# Patient Record
Sex: MALE | Race: WHITE | NOT HISPANIC OR LATINO | Employment: FULL TIME | RURAL
[De-identification: names, ages, dates, MRNs, and addresses within clinical notes are randomized per-mention and may not be internally consistent; named-entity substitution may affect disease eponyms.]

---

## 2021-01-21 ENCOUNTER — HISTORICAL (OUTPATIENT)
Dept: ADMINISTRATIVE | Facility: HOSPITAL | Age: 26
End: 2021-01-21

## 2021-04-08 ENCOUNTER — HISTORICAL (OUTPATIENT)
Dept: ADMINISTRATIVE | Facility: HOSPITAL | Age: 26
End: 2021-04-08

## 2021-04-08 LAB — SARS-COV+SARS-COV-2 AG RESP QL IA.RAPID: NEGATIVE

## 2021-09-14 ENCOUNTER — OFFICE VISIT (OUTPATIENT)
Dept: FAMILY MEDICINE | Facility: CLINIC | Age: 26
End: 2021-09-14
Payer: OTHER GOVERNMENT

## 2021-09-14 DIAGNOSIS — R05.8 COUGH WITH EXPOSURE TO SEVERE ACUTE RESPIRATORY SYNDROME CORONAVIRUS 2 (SARS-COV-2): Primary | ICD-10-CM

## 2021-09-14 DIAGNOSIS — Z20.822 COUGH WITH EXPOSURE TO SEVERE ACUTE RESPIRATORY SYNDROME CORONAVIRUS 2 (SARS-COV-2): Primary | ICD-10-CM

## 2021-09-14 LAB
CTP QC/QA: YES
FLUAV AG NPH QL: NEGATIVE
FLUBV AG NPH QL: NEGATIVE
SARS-COV-2 AG RESP QL IA.RAPID: NEGATIVE

## 2021-09-14 PROCEDURE — 99202 OFFICE O/P NEW SF 15 MIN: CPT | Mod: GC,,, | Performed by: FAMILY MEDICINE

## 2021-09-14 PROCEDURE — 87428 POCT SARS-COV2 (COVID) WITH FLU ANTIGEN: ICD-10-PCS | Mod: QW,GC,, | Performed by: FAMILY MEDICINE

## 2021-09-14 PROCEDURE — 87428 SARSCOV & INF VIR A&B AG IA: CPT | Mod: QW,GC,, | Performed by: FAMILY MEDICINE

## 2021-09-14 PROCEDURE — 99202 PR OFFICE/OUTPT VISIT, NEW, LEVL II, 15-29 MIN: ICD-10-PCS | Mod: GC,,, | Performed by: FAMILY MEDICINE

## 2022-02-04 ENCOUNTER — OFFICE VISIT (OUTPATIENT)
Dept: FAMILY MEDICINE | Facility: CLINIC | Age: 27
End: 2022-02-04
Payer: OTHER GOVERNMENT

## 2022-02-04 VITALS — HEART RATE: 51 BPM | SYSTOLIC BLOOD PRESSURE: 115 MMHG | DIASTOLIC BLOOD PRESSURE: 76 MMHG

## 2022-02-04 DIAGNOSIS — R00.1 BRADYCARDIA: Chronic | ICD-10-CM

## 2022-02-04 DIAGNOSIS — Z12.5 SCREENING FOR MALIGNANT NEOPLASM OF PROSTATE: ICD-10-CM

## 2022-02-04 DIAGNOSIS — G89.29 CHRONIC RECTAL PAIN: Primary | Chronic | ICD-10-CM

## 2022-02-04 DIAGNOSIS — G43.709 CHRONIC MIGRAINE WITHOUT AURA WITHOUT STATUS MIGRAINOSUS, NOT INTRACTABLE: Chronic | ICD-10-CM

## 2022-02-04 DIAGNOSIS — K62.89 CHRONIC RECTAL PAIN: Primary | Chronic | ICD-10-CM

## 2022-02-04 DIAGNOSIS — R10.30 LOWER ABDOMINAL PAIN: ICD-10-CM

## 2022-02-04 LAB
ALBUMIN SERPL BCP-MCNC: 4.1 G/DL (ref 3.5–5)
ALBUMIN/GLOB SERPL: 1.1 {RATIO}
ALP SERPL-CCNC: 93 U/L (ref 45–115)
ALT SERPL W P-5'-P-CCNC: 39 U/L (ref 16–61)
ANION GAP SERPL CALCULATED.3IONS-SCNC: 7 MMOL/L (ref 7–16)
AST SERPL W P-5'-P-CCNC: 17 U/L (ref 15–37)
BASOPHILS # BLD AUTO: 0.04 K/UL (ref 0–0.2)
BASOPHILS NFR BLD AUTO: 0.6 % (ref 0–1)
BILIRUB SERPL-MCNC: 0.4 MG/DL (ref 0–1.2)
BUN SERPL-MCNC: 15 MG/DL (ref 7–18)
BUN/CREAT SERPL: 16 (ref 6–20)
CALCIUM SERPL-MCNC: 9.7 MG/DL (ref 8.5–10.1)
CHLORIDE SERPL-SCNC: 104 MMOL/L (ref 98–107)
CO2 SERPL-SCNC: 32 MMOL/L (ref 21–32)
CREAT SERPL-MCNC: 0.92 MG/DL (ref 0.7–1.3)
DIFFERENTIAL METHOD BLD: ABNORMAL
EOSINOPHIL # BLD AUTO: 0.07 K/UL (ref 0–0.5)
EOSINOPHIL NFR BLD AUTO: 1 % (ref 1–4)
ERYTHROCYTE [DISTWIDTH] IN BLOOD BY AUTOMATED COUNT: 12.8 % (ref 11.5–14.5)
GLOBULIN SER-MCNC: 3.9 G/DL (ref 2–4)
GLUCOSE SERPL-MCNC: 89 MG/DL (ref 74–106)
HCT VFR BLD AUTO: 44.1 % (ref 40–54)
HGB BLD-MCNC: 14.6 G/DL (ref 13.5–18)
IMM GRANULOCYTES # BLD AUTO: 0.01 K/UL (ref 0–0.04)
IMM GRANULOCYTES NFR BLD: 0.1 % (ref 0–0.4)
LYMPHOCYTES # BLD AUTO: 2.37 K/UL (ref 1–4.8)
LYMPHOCYTES NFR BLD AUTO: 34.9 % (ref 27–41)
MCH RBC QN AUTO: 29.3 PG (ref 27–31)
MCHC RBC AUTO-ENTMCNC: 33.1 G/DL (ref 32–36)
MCV RBC AUTO: 88.6 FL (ref 80–96)
MONOCYTES # BLD AUTO: 0.66 K/UL (ref 0–0.8)
MONOCYTES NFR BLD AUTO: 9.7 % (ref 2–6)
MPC BLD CALC-MCNC: 11.3 FL (ref 9.4–12.4)
NEUTROPHILS # BLD AUTO: 3.65 K/UL (ref 1.8–7.7)
NEUTROPHILS NFR BLD AUTO: 53.7 % (ref 53–65)
NRBC # BLD AUTO: 0 X10E3/UL
NRBC, AUTO (.00): 0 %
PLATELET # BLD AUTO: 317 K/UL (ref 150–400)
POTASSIUM SERPL-SCNC: 4.7 MMOL/L (ref 3.5–5.1)
PROT SERPL-MCNC: 8 G/DL (ref 6.4–8.2)
PSA SERPL-MCNC: 0.47 NG/ML (ref 0–1.4)
RBC # BLD AUTO: 4.98 M/UL (ref 4.6–6.2)
SODIUM SERPL-SCNC: 138 MMOL/L (ref 136–145)
WBC # BLD AUTO: 6.8 K/UL (ref 4.5–11)

## 2022-02-04 PROCEDURE — 99214 OFFICE O/P EST MOD 30 MIN: CPT | Mod: ,,, | Performed by: FAMILY MEDICINE

## 2022-02-04 PROCEDURE — G0103 PSA, SCREENING: ICD-10-PCS | Mod: ,,, | Performed by: CLINICAL MEDICAL LABORATORY

## 2022-02-04 PROCEDURE — 80053 COMPREHEN METABOLIC PANEL: CPT | Mod: ,,, | Performed by: CLINICAL MEDICAL LABORATORY

## 2022-02-04 PROCEDURE — 85025 COMPLETE CBC W/AUTO DIFF WBC: CPT | Mod: ,,, | Performed by: CLINICAL MEDICAL LABORATORY

## 2022-02-04 PROCEDURE — G0103 PSA SCREENING: HCPCS | Mod: ,,, | Performed by: CLINICAL MEDICAL LABORATORY

## 2022-02-04 PROCEDURE — 85025 CBC WITH DIFFERENTIAL: ICD-10-PCS | Mod: ,,, | Performed by: CLINICAL MEDICAL LABORATORY

## 2022-02-04 PROCEDURE — 80053 COMPREHENSIVE METABOLIC PANEL: ICD-10-PCS | Mod: ,,, | Performed by: CLINICAL MEDICAL LABORATORY

## 2022-02-04 PROCEDURE — 99214 PR OFFICE/OUTPT VISIT, EST, LEVL IV, 30-39 MIN: ICD-10-PCS | Mod: ,,, | Performed by: FAMILY MEDICINE

## 2022-02-04 NOTE — PROGRESS NOTES
"   Quique Ivan MD   [facility]  MEDICAL GROUP [address] [place] [address]  [phone]      PATIENT NAME: Barry Mccoy  : 1995  DATE: 22  MRN: [id number]      Billing Provider: Quiqeu Ivan MD  Level of Service:   Patient PCP Information       Provider PCP Type    Quique Ivan MD General            Reason for Visit / Chief Complaint: Other (Complains of pain in rectum that has been present for 3 years. States it first start when he was in [employer] 3 years ago. No blood in stool, changes in stool, or hx of hemorrhoids. )       Update PCP  Update Chief Complaint         History of Present Illness / Problem Focused Workflow     Barry Mccoy presents to the clinic with Other (Complains of pain in rectum that has been present for 3 years. States it first start when he was in [employer] 3 years ago. No blood in stool, changes in stool, or hx of hemorrhoids. )       Symptoms started while serving in [employer] 3 years ago and have persisted since.  They are not constant but happen intermittently.  He says that he has rectal pain that can be "gripping the wall pain" in severity.  He says that it will typically happen at night  And also occurs after having sex with his wife.  He denies any h/o hemorrhoids, IBD, IBS, anal fissure, fistula or prostate problems.  Denies any h/o parasitic infection.   He does have some nausea and vomiting, but relates that to having migraine headaches.  S[facility] not have any associated systemic symptoms or illness that he can recall.        Review of Systems     Review of Systems   Constitutional: Positive for fatigue (thinks is just from working all the time) and unexpected weight change (he does report about a "40-50 lb weight gain" in the past 3 years). Negative for activity change, appetite change, chills and fever.   HENT: Negative for sore " throat and trouble swallowing.    Respiratory: Negative for shortness of breath.    Cardiovascular: Negative for chest pain, palpitations and leg swelling.   Gastrointestinal: Positive for nausea (associated with migraines), rectal pain and vomiting (associated with migraines). Negative for abdominal distention, abdominal pain, anal bleeding, blood in stool, change in bowel habit, diarrhea, fecal incontinence and change in bowel habit.   Genitourinary: Negative for difficulty urinating, discharge, erectile dysfunction, frequency, hematuria, penile pain, scrotal swelling and testicular pain.        Denies any hematospermia   Musculoskeletal: Positive for arthralgias (foot from injury). Negative for joint swelling.   Neurological: Positive for headaches (has migraines). Negative for dizziness and syncope.        Medical / Social / Family History     Past Medical History:   Diagnosis Date    Bradycardia     Migraine headache        History reviewed. No pertinent surgical history.    Social History    reports that he has been smoking. He has never used smokeless tobacco. He reports current alcohol use. He reports that he does not use drugs.   Social History     Tobacco Use    Smoking status: Light Tobacco Smoker    Smokeless tobacco: Never Used    Tobacco comment: occasional smokes cigars   Substance Use Topics    Alcohol use: Yes     Comment: occasional    Drug use: Never       Family History  Family History   Problem Relation Age of Onset    Diabetes Other     Bradycardia Other     Prostate cancer Neg Hx     Lung cancer Neg Hx     Colon cancer Neg Hx        Medications and Allergies     Medications  No outpatient medications have been marked as taking for the 2/4/22 encounter (Office Visit) with Quique Ivan MD.       Allergies  Review of patient's allergies indicates:  No Known Allergies    Physical Examination     Vitals:    02/04/22 1115   BP: 115/76   Pulse: (!) 51     Physical Exam  Vitals  reviewed.   Constitutional:       Appearance: Normal appearance.   HENT:      Head: Normocephalic and atraumatic.   Eyes:      Extraocular Movements: Extraocular movements intact.      Conjunctiva/sclera: Conjunctivae normal.      Pupils: Pupils are equal, round, and reactive to light.   Cardiovascular:      Rate and Rhythm: Normal rate and regular rhythm.      Heart sounds: Normal heart sounds.   Pulmonary:      Effort: Pulmonary effort is normal.      Breath sounds: Normal breath sounds.   Musculoskeletal:         General: Normal range of motion.      Cervical back: Normal range of motion and neck supple.   Skin:     General: Skin is warm and dry.   Neurological:      General: No focal deficit present.      Mental Status: He is alert and oriented to person, place, and time.   Psychiatric:         Mood and Affect: Mood normal.         Behavior: Behavior normal.          Assessment and Plan (including Health Maintenance)      Problem List  Smart Sets  Document Outside HM   :    Plan: labs including CBC, CMP, PSA and stool studies for occult blood and OCP        Health Maintenance Due   Topic Date Due    Hepatitis C Screening  Never done    Lipid Panel  Never done    COVID-19 Vaccine (1) Never done    Pneumococcal Vaccines (Age 0-64) (1 of 2 - PPSV23) Never done    HPV Vaccines (1 - Male 2-dose series) Never done    HIV Screening  Never done    TETANUS VACCINE  Never done    Influenza Vaccine (1) 09/01/2021       Problem List Items Addressed This Visit        Neuro    Chronic migraine without aura without status migrainosus, not intractable       Cardiac/Vascular    Bradycardia       GI    Chronic rectal pain - Primary      Other Visit Diagnoses     Lower abdominal pain        Relevant Orders    CBC Auto Differential    Comprehensive Metabolic Panel    Occult blood x 3, stool    Ova and Parasite Exam    Screening for malignant neoplasm of prostate        Relevant Orders    PSA, Screening          The patient  has no Health Maintenance topics of status Not Due    No future appointments.         Signature:  MD NIKO [facility]  MEDICAL GROUP [facility] FAM[facility] [address] [address]  [phone]    Date of encounter: 2/4/22

## 2022-02-08 LAB
OCCULT BLOOD, SPECIMEN 2: NEGATIVE
OCCULT BLOOD, SPECIMEN 3: NEGATIVE
OCCULT BLOOD: NEGATIVE

## 2022-02-08 PROCEDURE — 87209 OVA AND PARASITE EXAM: ICD-10-PCS | Mod: ,,, | Performed by: CLINICAL MEDICAL LABORATORY

## 2022-02-08 PROCEDURE — 82272 OCCULT BLD FECES 1-3 TESTS: CPT | Mod: ,,, | Performed by: CLINICAL MEDICAL LABORATORY

## 2022-02-08 PROCEDURE — 87177 OVA AND PARASITE EXAM: ICD-10-PCS | Mod: ,,, | Performed by: CLINICAL MEDICAL LABORATORY

## 2022-02-08 PROCEDURE — 82272 OCCULT BLOOD X 3, STOOL: ICD-10-PCS | Mod: ,,, | Performed by: CLINICAL MEDICAL LABORATORY

## 2022-02-08 PROCEDURE — 87209 SMEAR COMPLEX STAIN: CPT | Mod: ,,, | Performed by: CLINICAL MEDICAL LABORATORY

## 2022-02-08 PROCEDURE — 87177 OVA AND PARASITES SMEARS: CPT | Mod: ,,, | Performed by: CLINICAL MEDICAL LABORATORY

## 2022-02-10 LAB
O+P STL CONC: NORMAL
TRICHROME SMEAR: NORMAL

## 2022-03-10 ENCOUNTER — OFFICE VISIT (OUTPATIENT)
Dept: GASTROENTEROLOGY | Facility: CLINIC | Age: 27
End: 2022-03-10
Payer: OTHER GOVERNMENT

## 2022-03-10 VITALS
OXYGEN SATURATION: 99 % | HEIGHT: 71 IN | HEART RATE: 58 BPM | DIASTOLIC BLOOD PRESSURE: 61 MMHG | WEIGHT: 234 LBS | BODY MASS INDEX: 32.76 KG/M2 | SYSTOLIC BLOOD PRESSURE: 130 MMHG

## 2022-03-10 DIAGNOSIS — G89.29 CHRONIC RECTAL PAIN: Primary | Chronic | ICD-10-CM

## 2022-03-10 DIAGNOSIS — R68.89 COLD FEELING: ICD-10-CM

## 2022-03-10 DIAGNOSIS — R19.4 CHANGE IN BOWEL HABITS: ICD-10-CM

## 2022-03-10 DIAGNOSIS — K62.89 CHRONIC RECTAL PAIN: Primary | Chronic | ICD-10-CM

## 2022-03-10 PROCEDURE — 99214 PR OFFICE/OUTPT VISIT, EST, LEVL IV, 30-39 MIN: ICD-10-PCS | Mod: ,,, | Performed by: NURSE PRACTITIONER

## 2022-03-10 PROCEDURE — 99214 OFFICE O/P EST MOD 30 MIN: CPT | Mod: ,,, | Performed by: NURSE PRACTITIONER

## 2022-03-10 NOTE — PROGRESS NOTES
Pt is a 25 y/o WM here for rectal pain x3 years. 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 Denies known hemorrhoids or prostate problems. Some testicular pain during episodes but pain is mainly in the rectum. No problems with urination. Pt states the pain is intermittent, sharp, severe, and usually happens during sleep which wakes him. He has extreme urgency to have BM when it happens but usually cannot have BM at that time. Pt has daily BM up to 3x otherwise. Denies hematochezia or melena. Pt notices pain also after intercourse.  Denies family hx of CRC. Denies prior endoscopy.  CBC, CMP, PSA WNL at PCP on 2/4/22.  C/o alternating constipation, diarrhea, feeling cold all the time.    Past Medical History:   Diagnosis Date    Bradycardia     Migraine headache        Review of Systems   Constitutional: Negative for chills and fever.   Respiratory: Negative for shortness of breath.    Cardiovascular: Negative for chest pain.   Gastrointestinal: Positive for constipation and diarrhea. Negative for abdominal pain, blood in stool, melena, nausea and vomiting.   Skin: Negative for rash.   Neurological: Negative for weakness.     Physical Exam  Vitals reviewed. Exam conducted with a chaperone present.   Constitutional:       Appearance: Normal appearance.   HENT:      Head: Normocephalic.   Eyes:      General: No scleral icterus.     Pupils: Pupils are equal, round, and reactive to light.   Cardiovascular:      Rate and Rhythm: Normal rate.      Pulses: Normal pulses.   Pulmonary:      Effort: Pulmonary effort is normal.   Abdominal:      General: Abdomen is flat. There is no distension.      Palpations: Abdomen is soft.      Tenderness: There is no abdominal tenderness. There is no guarding or rebound.   Musculoskeletal:         General: No swelling.   Skin:     General: Skin is warm and dry.      Coloration: Skin is not jaundiced.   Neurological:      General: No focal  deficit present.      Mental Status: He is alert and oriented to person, place, and time.   Psychiatric:         Mood and Affect: Mood normal.         Behavior: Behavior normal.         Thought Content: Thought content normal.         Judgment: Judgment normal.       Plan  -TSH, T4  -colonoscopy for rectal pain, change in bowel habits, alternating constipation/diarrhea  -f/u PCP for prostate exam  -RTC 3 months, sooner PRN

## 2022-04-13 DIAGNOSIS — Z11.52 ENCOUNTER FOR SCREENING FOR SEVERE ACUTE RESPIRATORY SYNDROME CORONAVIRUS 2 (SARS-COV-2) INFECTION: Primary | ICD-10-CM

## 2022-08-22 ENCOUNTER — OFFICE VISIT (OUTPATIENT)
Dept: FAMILY MEDICINE | Facility: CLINIC | Age: 27
End: 2022-08-22
Payer: OTHER GOVERNMENT

## 2022-08-22 VITALS
OXYGEN SATURATION: 99 % | RESPIRATION RATE: 18 BRPM | DIASTOLIC BLOOD PRESSURE: 67 MMHG | HEIGHT: 71 IN | SYSTOLIC BLOOD PRESSURE: 129 MMHG | WEIGHT: 224 LBS | TEMPERATURE: 99 F | HEART RATE: 74 BPM | BODY MASS INDEX: 31.36 KG/M2

## 2022-08-22 DIAGNOSIS — Z11.52 ENCOUNTER FOR SCREENING LABORATORY TESTING FOR COVID-19 VIRUS: Primary | ICD-10-CM

## 2022-08-22 DIAGNOSIS — J01.90 ACUTE NON-RECURRENT SINUSITIS, UNSPECIFIED LOCATION: ICD-10-CM

## 2022-08-22 DIAGNOSIS — J20.9 ACUTE BRONCHITIS, UNSPECIFIED ORGANISM: ICD-10-CM

## 2022-08-22 PROCEDURE — 99213 OFFICE O/P EST LOW 20 MIN: CPT | Mod: 25,,, | Performed by: FAMILY MEDICINE

## 2022-08-22 PROCEDURE — 96372 PR INJECTION,THERAP/PROPH/DIAG2ST, IM OR SUBCUT: ICD-10-PCS | Mod: ,,, | Performed by: FAMILY MEDICINE

## 2022-08-22 PROCEDURE — 99213 PR OFFICE/OUTPT VISIT, EST, LEVL III, 20-29 MIN: ICD-10-PCS | Mod: 25,,, | Performed by: FAMILY MEDICINE

## 2022-08-22 PROCEDURE — 87428 SARSCOV & INF VIR A&B AG IA: CPT | Mod: QW,,, | Performed by: FAMILY MEDICINE

## 2022-08-22 PROCEDURE — 87428 POCT SARS-COV2 (COVID) WITH FLU ANTIGEN: ICD-10-PCS | Mod: QW,,, | Performed by: FAMILY MEDICINE

## 2022-08-22 PROCEDURE — 96372 THER/PROPH/DIAG INJ SC/IM: CPT | Mod: ,,, | Performed by: FAMILY MEDICINE

## 2022-08-22 RX ORDER — LEVOFLOXACIN 500 MG/1
500 TABLET, FILM COATED ORAL DAILY
Qty: 7 TABLET | Refills: 0 | Status: SHIPPED | OUTPATIENT
Start: 2022-08-22

## 2022-08-22 RX ORDER — PREDNISONE 20 MG/1
TABLET ORAL
Qty: 10 TABLET | Refills: 0 | Status: SHIPPED | OUTPATIENT
Start: 2022-08-22 | End: 2022-11-30

## 2022-08-22 RX ORDER — DEXAMETHASONE SODIUM PHOSPHATE 4 MG/ML
6 INJECTION, SOLUTION INTRA-ARTICULAR; INTRALESIONAL; INTRAMUSCULAR; INTRAVENOUS; SOFT TISSUE
Status: COMPLETED | OUTPATIENT
Start: 2022-08-22 | End: 2022-08-22

## 2022-08-22 RX ADMIN — DEXAMETHASONE SODIUM PHOSPHATE 6 MG: 4 INJECTION, SOLUTION INTRA-ARTICULAR; INTRALESIONAL; INTRAMUSCULAR; INTRAVENOUS; SOFT TISSUE at 02:08

## 2022-08-22 NOTE — PROGRESS NOTES
Subjective:       Patient ID: Barry Mccoy is a 27 y.o. male.    Chief Complaint: No chief complaint on file.    Congestion and mild cough for several weeks.  Cough is getting worse.  Constant headache for several days.  No fever    Review of Systems      Objective:      Physical Exam  Constitutional:       General: He is not in acute distress.     Appearance: He is ill-appearing ( mildly).   HENT:      Nose: Congestion (TTP right maxillary sinus) present.      Mouth/Throat:      Pharynx: No oropharyngeal exudate or posterior oropharyngeal erythema.   Cardiovascular:      Rate and Rhythm: Normal rate and regular rhythm.   Pulmonary:      Effort: Pulmonary effort is normal.      Breath sounds: Rales ( Saint scattered coarse crackles) present. No wheezing.   Neurological:      Mental Status: He is alert.         Assessment:       Problem List Items Addressed This Visit    None     Visit Diagnoses     Encounter for screening laboratory testing for COVID-19 virus    -  Primary    Relevant Orders    POCT SARS-COV2 (COVID) with Flu Antigen          Plan:         COVID negative.  Treat with steroids and levofloxacin

## 2022-11-30 ENCOUNTER — OFFICE VISIT (OUTPATIENT)
Dept: FAMILY MEDICINE | Facility: CLINIC | Age: 27
End: 2022-11-30
Payer: OTHER GOVERNMENT

## 2022-11-30 VITALS
BODY MASS INDEX: 31.5 KG/M2 | HEART RATE: 101 BPM | DIASTOLIC BLOOD PRESSURE: 77 MMHG | SYSTOLIC BLOOD PRESSURE: 139 MMHG | HEIGHT: 71 IN | TEMPERATURE: 98 F | WEIGHT: 225 LBS

## 2022-11-30 DIAGNOSIS — J02.9 PHARYNGITIS, UNSPECIFIED ETIOLOGY: Primary | ICD-10-CM

## 2022-11-30 PROCEDURE — 99213 OFFICE O/P EST LOW 20 MIN: CPT | Mod: 25,,, | Performed by: FAMILY MEDICINE

## 2022-11-30 PROCEDURE — 96372 THER/PROPH/DIAG INJ SC/IM: CPT | Mod: ,,, | Performed by: FAMILY MEDICINE

## 2022-11-30 PROCEDURE — 99213 PR OFFICE/OUTPT VISIT, EST, LEVL III, 20-29 MIN: ICD-10-PCS | Mod: 25,,, | Performed by: FAMILY MEDICINE

## 2022-11-30 PROCEDURE — 96372 PR INJECTION,THERAP/PROPH/DIAG2ST, IM OR SUBCUT: ICD-10-PCS | Mod: ,,, | Performed by: FAMILY MEDICINE

## 2022-11-30 RX ORDER — DEXAMETHASONE SODIUM PHOSPHATE 4 MG/ML
4 INJECTION, SOLUTION INTRA-ARTICULAR; INTRALESIONAL; INTRAMUSCULAR; INTRAVENOUS; SOFT TISSUE
Status: COMPLETED | OUTPATIENT
Start: 2022-11-30 | End: 2022-11-30

## 2022-11-30 RX ORDER — PENICILLIN V POTASSIUM 500 MG/1
500 TABLET, FILM COATED ORAL EVERY 8 HOURS
Qty: 30 TABLET | Refills: 0 | Status: SHIPPED | OUTPATIENT
Start: 2022-11-30 | End: 2022-12-10

## 2022-11-30 RX ADMIN — DEXAMETHASONE SODIUM PHOSPHATE 4 MG: 4 INJECTION, SOLUTION INTRA-ARTICULAR; INTRALESIONAL; INTRAMUSCULAR; INTRAVENOUS; SOFT TISSUE at 03:11

## 2022-11-30 NOTE — PROGRESS NOTES
Stef Hair IV, DO  [facility]  [address] [place] [address]  Phone: [phone]      Subjective     Name: Barry Mccoy   Sex: male  YOB: 1995 (27 y.o.)  MRN: [id number]  Visit Date: 11/30/2022   Chief Complaint: Sore Throat (Wife and child had strep in the last week)        HISTORY OF PRESENT ILLNESS:    Patient presents to clinic with a chief complaint of a sore throat.  He states that his wife currently has strep throat in the assume that they got from their child.  She was being treated currently and he is interested in getting treated himself.  Considering that he has had fever with a T-max greater than 102 I am going to prophylactically treat this strep pharyngitis with penicillin V with potassium 500 mg p.o. t.i.d. for total of 10 days.  Patient was made aware that streptococcal pharyngitis is self-limited in nature why we are treating.  Patient also states that his wife got a steroid shot she seems to be feeling much better and he is asking if he can have the same.  I explained the patient the risks and benefits of steroid shots in he has elected to go forward with it.  I am going to give him an intramuscular injection of dexamethasone 4 mg 1 time in the office.  Patient advised to let us know if symptoms worsen or fail to resolve.      This document was dictated using mmodal fluency direct.  It is subject to dictation errors.    PAST MEDICAL HISTORY:  Significant Diagnoses - Patient  has a past medical history of Bradycardia and Migraine headache.  Medications - Patient is not on any long-term medications.   Allergies - Patient has No Known Allergies.  Surgeries - Patient  has a past surgical history that includes [place] tooth extraction.  Family History - Patient family history includes Bradycardia in an other family member; Diabetes in an other family member.      SOCIAL HISTORY:  Tobacco - Patient  reports that he has been smoking  "cigars. He has never used smokeless tobacco.   Alcohol - Patient  reports current alcohol use.   Recreational Drugs - Patient  reports no history of drug use.       Review of Systems   Constitutional:  Negative for fatigue and fever.   HENT:  Positive for sore throat. Negative for nasal congestion.    Respiratory:  Negative for cough and shortness of breath.    Cardiovascular:  Negative for chest pain.   Gastrointestinal:  Negative for abdominal pain, nausea and vomiting.   Genitourinary:  Negative for dysuria.   Musculoskeletal:  Negative for myalgias.   Integumentary:  Negative for rash.   Neurological:  Negative for dizziness, weakness and headaches.        Past Medical History:   Diagnosis Date    Bradycardia     Migraine headache         Review of patient's allergies indicates:  No Known Allergies     Past Surgical History:   Procedure Laterality Date    WISDOM TOOTH EXTRACTION          Family History   Problem Relation Age of Onset    Diabetes Other     Bradycardia Other     Prostate cancer Neg Hx     Lung cancer Neg Hx     Colon cancer Neg Hx        Current Outpatient Medications   Medication Instructions    levoFLOXacin (LEVAQUIN) 500 mg, Oral, Daily    penicillin v potassium (VEETID) 500 mg, Oral, Every 8 hours        Objective     /77   Pulse 101   Temp 98.1 °F (36.7 °C)   Ht 5' 11" (1.803 m)   Wt 102.1 kg (225 lb)   BMI 31.38 kg/m²     Physical Exam  Constitutional:       General: He is not in acute distress.     Appearance: Normal appearance. He is not ill-appearing.   HENT:      Head: Normocephalic and atraumatic.      Right Ear: External ear normal.      Left Ear: External ear normal.      Nose: Nose normal.   Eyes:      Extraocular Movements: Extraocular movements intact.   Cardiovascular:      Rate and Rhythm: Normal rate.   Pulmonary:      Effort: Pulmonary effort is normal.   Abdominal:      Palpations: Abdomen is soft.   Musculoskeletal:      Cervical back: Normal range of " motion. No tenderness.   Neurological:      Mental Status: He is alert.   Psychiatric:         Mood and Affect: Mood normal.         Behavior: Behavior normal.        All recently obtained labs have been reviewed and discussed in detail with the patient.   Assessment     1. Pharyngitis, unspecified etiology         Plan        Problem List Items Addressed This Visit    None  Visit Diagnoses       Pharyngitis, unspecified etiology    -  Primary    Relevant Medications    penicillin v potassium (VEETID) 500 MG tablet    dexAMETHasone injection 4 mg (Completed)            No follow-ups on file.    Patient advised that is symptoms worsen, they should call or report directly to local emergency department.    Stef Hair,   [facility]

## 2024-04-11 ENCOUNTER — OFFICE VISIT (OUTPATIENT)
Dept: FAMILY MEDICINE | Facility: CLINIC | Age: 29
End: 2024-04-11
Payer: OTHER GOVERNMENT

## 2024-04-11 VITALS
HEART RATE: 49 BPM | SYSTOLIC BLOOD PRESSURE: 126 MMHG | WEIGHT: 218 LBS | HEIGHT: 71 IN | BODY MASS INDEX: 30.52 KG/M2 | DIASTOLIC BLOOD PRESSURE: 70 MMHG

## 2024-04-11 DIAGNOSIS — G89.29 CHRONIC PAIN OF RIGHT KNEE: Primary | ICD-10-CM

## 2024-04-11 DIAGNOSIS — M25.561 CHRONIC PAIN OF RIGHT KNEE: Primary | ICD-10-CM

## 2024-04-11 DIAGNOSIS — N53.12 DYSPAREUNIA, MALE: ICD-10-CM

## 2024-04-11 DIAGNOSIS — M23.206 OLD TEAR OF MENISCUS OF RIGHT KNEE, UNSPECIFIED MENISCUS, UNSPECIFIED TEAR TYPE: ICD-10-CM

## 2024-04-11 PROCEDURE — 99214 OFFICE O/P EST MOD 30 MIN: CPT | Mod: ,,, | Performed by: FAMILY MEDICINE

## 2024-04-11 RX ORDER — TAMSULOSIN HYDROCHLORIDE 0.4 MG/1
0.4 CAPSULE ORAL DAILY
Qty: 90 CAPSULE | Refills: 3 | Status: SHIPPED | OUTPATIENT
Start: 2024-04-11 | End: 2025-04-11

## 2024-04-11 NOTE — PROGRESS NOTES
"              Stef Hair IV, DO  [facility]  [address] [place] [address]  Phone: [phone]      Subjective     Name: Barry Mccoy   Sex: male  YOB: 1995 (29 y.o.)  MRN: [id number]  Visit Date: 04/11/2024   Chief Complaint: Referral (Wants ortho referral for previous injury)        HISTORY OF PRESENT ILLNESS:    Chief Complaint   Patient presents with    Referral     Wants ortho referral for previous injury       HPI  See tests that keep you healthy and the problem oriented documentation below.    All of your core healthy metrics are met.      Portions of this note may have been created with voice recognition software. Occasional "wrong-word" or "sound-a-like" substitutions may have occurred due to the inherent limitations of voice recognition software. Please, read the note carefully and recognize, using context, where substitutions have occurred.     PAST MEDICAL HISTORY:  Significant Diagnoses - Patient  has a past medical history of Bradycardia and Migraine headache.  Medications - Patient has a current medication list which includes the following long-term medication(s): tamsulosin.   Allergies - Patient has No Known Allergies.  Surgeries - Patient  has a past surgical history that includes [place] tooth extraction.  Family History - Patient family history includes Bradycardia in an other family member; Diabetes in an other family member.      SOCIAL HISTORY:  Tobacco - Patient  reports that he has been smoking cigars. He has never used smokeless tobacco.   Alcohol - Patient  reports current alcohol use.   Recreational Drugs - Patient  reports no history of drug use.       Review of Systems   All other systems reviewed and are negative.         Past Medical History:   Diagnosis Date    Bradycardia     Migraine headache         Review of patient's allergies indicates:  No Known Allergies     Past Surgical History:   Procedure Laterality Date    WISDOM TOOTH " "EXTRACTION          Family History   Problem Relation Age of Onset    Diabetes Other     Bradycardia Other     Prostate cancer Neg Hx     Lung cancer Neg Hx     Colon cancer Neg Hx        Current Outpatient Medications   Medication Instructions    tamsulosin (FLOMAX) 0.4 mg, Oral, Daily        Objective     /70   Pulse (!) 49   Ht 5' 11" (1.803 m)   Wt 98.9 kg (218 lb)   BMI 30.40 kg/m²     Physical Exam  Constitutional:       General: He is not in acute distress.     Appearance: Normal appearance. He is not ill-appearing.   HENT:      Head: Normocephalic and atraumatic.      Right Ear: External ear normal.      Left Ear: External ear normal.      Nose: Nose normal.   Eyes:      Extraocular Movements: Extraocular movements intact.   Cardiovascular:      Rate and Rhythm: Normal rate.   Pulmonary:      Effort: Pulmonary effort is normal.   Abdominal:      Palpations: Abdomen is soft.   Musculoskeletal:      Cervical back: Normal range of motion. No tenderness.   Neurological:      Mental Status: He is alert.   Psychiatric:         Mood and Affect: Mood normal.         Behavior: Behavior normal.          All recently obtained labs have been reviewed and discussed in detail with the patient.   Assessment     1. Chronic pain of right knee    2. Old tear of meniscus of right knee, unspecified meniscus, unspecified tear type    3. Dyspareunia, male         Plan        Problem List Items Addressed This Visit          Renal/    Dyspareunia, male    Relevant Medications    tamsulosin (FLOMAX) 0.4 mg Cap       Orthopedic    Chronic pain of right knee - Primary    Relevant Orders    Ambulatory referral/consult to Orthopedics    Old tear of meniscus of right knee     Chronic problem for patient with progression of symptoms.  Patient with a previous meniscal damage approximately 5 years ago imaging done on March 15, 2024 reviewed from outside facility.  I did review greater than 3 diagnostic tests including outside " labs and imaging as well as the outside MRI the patient provided on his digital chart.  There is still meniscal tear present.  [place] hyaluronic acid injection.  I will refer to orthopedics for further evaluation and treatment.  Recommend naproxen sodium over-the-counter.         Relevant Orders    Ambulatory referral/consult to Orthopedics       No follow-ups on file.    Patient advised that is symptoms worsen, they should call or report directly to local emergency department.    Stef Hair,   [facility]

## 2024-04-11 NOTE — ASSESSMENT & PLAN NOTE
Chronic problem for patient with progression of symptoms.  Patient with a previous meniscal damage approximately 5 years ago imaging done on March 15, 2024 reviewed from outside facility.  I did review greater than 3 diagnostic tests including outside labs and imaging as well as the outside MRI the patient provided on his digital chart.  There is still meniscal tear present.  [place] hyaluronic acid injection.  I will refer to orthopedics for further evaluation and treatment.  Recommend naproxen sodium over-the-counter.

## 2024-04-12 DIAGNOSIS — M25.561 ACUTE PAIN OF RIGHT KNEE: Primary | ICD-10-CM

## 2024-04-15 ENCOUNTER — HOSPITAL ENCOUNTER (OUTPATIENT)
Dept: RADIOLOGY | Facility: HOSPITAL | Age: 29
Discharge: HOME | End: 2024-04-15
Payer: OTHER GOVERNMENT

## 2024-04-15 ENCOUNTER — OFFICE VISIT (OUTPATIENT)
Dept: ORTHOPEDICS | Facility: CLINIC | Age: 29
End: 2024-04-15
Payer: OTHER GOVERNMENT

## 2024-04-15 VITALS — WEIGHT: 218 LBS | HEIGHT: 71 IN | BODY MASS INDEX: 30.52 KG/M2

## 2024-04-15 DIAGNOSIS — M25.561 ACUTE PAIN OF RIGHT KNEE: ICD-10-CM

## 2024-04-15 DIAGNOSIS — G89.29 CHRONIC PAIN OF RIGHT KNEE: ICD-10-CM

## 2024-04-15 DIAGNOSIS — M25.561 CHRONIC PAIN OF RIGHT KNEE: ICD-10-CM

## 2024-04-15 DIAGNOSIS — M23.206 OLD TEAR OF MENISCUS OF RIGHT KNEE, UNSPECIFIED MENISCUS, UNSPECIFIED TEAR TYPE: ICD-10-CM

## 2024-04-15 PROCEDURE — 73562 X-RAY EXAM OF KNEE 3: CPT | Mod: 26,RT,, | Performed by: RADIOLOGY

## 2024-04-15 PROCEDURE — 73562 X-RAY EXAM OF KNEE 3: CPT | Mod: TC,RT

## 2024-04-15 PROCEDURE — 99213 OFFICE O/P EST LOW 20 MIN: CPT | Mod: PBBFAC,25

## 2024-04-15 PROCEDURE — 99213 OFFICE O/P EST LOW 20 MIN: CPT | Mod: S$PBB,,,

## 2024-04-15 NOTE — PROGRESS NOTES
CC:   Chief Complaint   Patient presents with    Right Knee - Pain          Barry Mccoy is a 29 y.o. male seen today for follow up of Pain of the Right Knee  [facility] Dr. Maciel [facility]  Patient states he was evaluated at time and recommended conservative management, nonsurgical.  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  Patient has MRI report today where there was a questionable small tear in the medial meniscus.  Patient reports pain and instability of the knee.  He denies any recent fall or injury.  No other complaints today.        PAST MEDICAL HISTORY:   Past Medical History:   Diagnosis Date    Bradycardia     Migraine headache         PAST SURGICAL HISTORY:   Past Surgical History:   Procedure Laterality Date    WISDOM TOOTH EXTRACTION          ALLERGIES: Review of patient's allergies indicates:  No Known Allergies     MEDICATIONS :    Current Outpatient Medications:     tamsulosin (FLOMAX) 0.4 mg Cap, Take 1 capsule (0.4 mg total) by mouth once daily., Disp: 90 capsule, Rfl: 3     SOCIAL HISTORY:   Social History     Socioeconomic History    Marital status:    Tobacco Use    Smoking status: Light Smoker     Types: Cigars    Smokeless tobacco: Never    Tobacco comments:     occasional smokes cigars   Substance and Sexual Activity    Alcohol use: Yes     Comment: occasional    Drug use: Never    Sexual activity: Yes        FAMILY HISTORY:   Family History   Problem Relation Name Age of Onset    Diabetes Other      Bradycardia Other      Prostate cancer Neg Hx      Lung cancer Neg Hx      Colon cancer Neg Hx            PHYSICAL EXAM:      There were no vitals filed for this visit.  Body mass index is 30.4 kg/m².    GENERAL: Well-developed, well-nourished male . The patient is alert, oriented and cooperative.    EXTREMITIES: Right knee was skin clean  dry and intact, tenderness to palpation along medial joint line, no soft tissue swelling or joint effusion appreciated on exam today, good range of motion from 0-120 degrees with the pain, knee is stable to varus and valgus stress testing, positive Emily's, neurovascularly intact      RADIOGRAPHIC FINDINGS:   X-Ray Knee AP LAT with Sunrise Right    Result Date: 4/15/2024  EXAMINATION: XR KNEE AP LAT WITH SUNRISE RIGHT CLINICAL HISTORY: Pain in right knee COMPARISON: 7 June 2019 TECHNIQUE: XR KNEE AP LAT WITH SUNRISE RIGHT FINDINGS: No evidence of fracture seen.  The alignment of the joints appears normal.  No degenerative change is present.  No soft tissue abnormality is seen.     No evidence of abnormality demonstrated Electronically signed by: Nathaniel Deleon Date:    04/15/2024 Time:    09:15       Patient Active Problem List    Diagnosis Date Noted    Chronic pain of right knee 04/11/2024    Old tear of meniscus of right knee 04/11/2024    Dyspareunia, male 04/11/2024    Chronic rectal pain 02/04/2022    Chronic migraine without aura without status migrainosus, not intractable 02/04/2022    Bradycardia 02/04/2022     IMPRESSION AND PLAN:  Right knee pain.  Patient has [place] with questionable small medial meniscus tear.  Discussed all treatment options with the patient today.  Discussed that he really needs to see Dr. Maciel considering MRIs already been done.  Scheduled him to see Dr. Maciel tomorrow.  Recommended that he bring the disc that was given to him with the MRI.  Had him e-mail the MRI results to Dr. Maciel's nurse.        No follow-ups on file.       Kim Fleming PA-C      (Subject to voice recognition error, transcription service not allowed)

## 2024-04-16 ENCOUNTER — OFFICE VISIT (OUTPATIENT)
Dept: ORTHOPEDICS | Facility: CLINIC | Age: 29
End: 2024-04-16
Payer: OTHER GOVERNMENT

## 2024-04-16 DIAGNOSIS — M25.561 ACUTE PAIN OF RIGHT KNEE: Primary | ICD-10-CM

## 2024-04-16 PROCEDURE — 99999PBSHW PR PBB SHADOW TECHNICAL ONLY FILED TO HB: Mod: JZ,PBBFAC,,

## 2024-04-16 PROCEDURE — 20610 DRAIN/INJ JOINT/BURSA W/O US: CPT | Mod: PBBFAC,RT | Performed by: ORTHOPAEDIC SURGERY

## 2024-04-16 PROCEDURE — 99203 OFFICE O/P NEW LOW 30 MIN: CPT | Mod: S$PBB,25,, | Performed by: ORTHOPAEDIC SURGERY

## 2024-04-16 PROCEDURE — 99212 OFFICE O/P EST SF 10 MIN: CPT | Mod: PBBFAC,25 | Performed by: ORTHOPAEDIC SURGERY

## 2024-04-16 PROCEDURE — 20610 DRAIN/INJ JOINT/BURSA W/O US: CPT | Mod: S$PBB,RT,, | Performed by: ORTHOPAEDIC SURGERY

## 2024-04-16 RX ORDER — IBUPROFEN 800 MG/1
TABLET ORAL
COMMUNITY
Start: 2024-03-04

## 2024-04-16 RX ADMIN — Medication 88 MG: at 04:04

## 2024-04-16 NOTE — PROGRESS NOTES
CC:   Chief Complaint   Patient presents with    Right Knee - Pain     BRINGING MRI DISC        PREVIOUS INFO:        HISTORY:   4/16/2024    Barry Mccoy  is a 29 y.o. comes in right knee pain he was deployed over in [facility] having pain saw an orthopedist got an MRI see below possible meniscal tear possible early wear orthopedist recommended to him possible viscosupplementation and he is interested in having that done  We also talked about possible arthroscopy I did discuss with him he does not have any tear of the scoped really not going to help he wants to try the the injection    Patient was seen previously in 2019 had a right knee injury knee boarding injury and he has had on and off symptoms since then  Running aggravates it he is sore for several days afterwards biking does not give him a problem squatting does give him a problem squatting is painful this is his right knee      PAST MEDICAL HISTORY:   Past Medical History:   Diagnosis Date    Bradycardia     Migraine headache           PAST SURGICAL HISTORY:   Past Surgical History:   Procedure Laterality Date    WISDOM TOOTH EXTRACTION            ALLERGIES: Review of patient's allergies indicates:  No Known Allergies     MEDICATIONS :    Current Outpatient Medications:     ibuprofen (ADVIL,MOTRIN) 800 MG tablet, , Disp: , Rfl:     tamsulosin (FLOMAX) 0.4 mg Cap, Take 1 capsule (0.4 mg total) by mouth once daily., Disp: 90 capsule, Rfl: 3     SOCIAL HISTORY:   Social History     Socioeconomic History    Marital status:    Tobacco Use    Smoking status: Light Smoker     Types: Cigars    Smokeless tobacco: Never    Tobacco comments:     occasional smokes cigars   Substance and Sexual Activity    Alcohol use: Yes     Comment: occasional    Drug use: Never    Sexual activity: Yes        ROS    FAMILY HISTORY:   Family History   Problem Relation Name Age of Onset    Diabetes Other      Bradycardia Other      Prostate cancer Neg Hx       Lung cancer Neg Hx      Colon cancer Neg Hx            PHYSICAL EXAM: There were no vitals filed for this visit.            There is no height or weight on file to calculate BMI.     In general, this is a well-developed, well-nourished male . The patient is alert, oriented and cooperative.      HEENT:  Normocephalic, atraumatic.  Extraocular movements are intact bilaterally.  The oropharynx is benign.       NECK:  Nontender with good range of motion.      PULMONARY: Respirations are even and non-labored.       CARDIOVASCULAR: Pulses regular by peripheral palpation.       ABDOMEN:  Soft, non-tender, non-distended.        EXTREMITIES:  Right lower extremity palpable posterior tibial pulse he is having medial symptoms joint line mildly tender Emily testing causes some pain laterally does not anterior-posterior drawer stable he is stable to varus and valgus stressing there is no effusion    Ortho Exam      RADIOGRAPHIC FINDINGS:  MRI performed in [facility] dated March 15, 2020 for right knee subtle increased signal of the medial meniscus posterior horn may reflect small tear    Per my review there is a little edema at the meniscocapsular junction posteriorly on the medial side I agree there has no evidence so a tear attack of the surface of the meniscus    MRI performed Imaging [facility] dated June 7, 2019 right knee partial tear of the proximal MCL small effusion      .      IMPRESSION:  Right knee pain has been going on now since 2019 options of viscosupplementation were talked to [place] previously [place] and he he wants to try the told him the be glad    PLAN:  Prep will Betadine we injected Monovisc he tolerated this well of the right knee  There are no Patient Instructions on file for this visit.      No follow-ups on file.         Phoenix Maciel III      (Subject to voice recognition error, transcription service not allowed)

## 2025-02-10 ENCOUNTER — OFFICE VISIT (OUTPATIENT)
Dept: FAMILY MEDICINE | Facility: CLINIC | Age: 30
End: 2025-02-10
Payer: OTHER GOVERNMENT

## 2025-02-10 VITALS
DIASTOLIC BLOOD PRESSURE: 70 MMHG | SYSTOLIC BLOOD PRESSURE: 125 MMHG | WEIGHT: 242 LBS | HEART RATE: 58 BPM | BODY MASS INDEX: 33.88 KG/M2 | HEIGHT: 71 IN

## 2025-02-10 DIAGNOSIS — B00.1 COLD SORE: Chronic | ICD-10-CM

## 2025-02-10 DIAGNOSIS — N46.9 INFERTILITY MALE: Primary | Chronic | ICD-10-CM

## 2025-02-10 PROCEDURE — 99214 OFFICE O/P EST MOD 30 MIN: CPT | Mod: ,,, | Performed by: FAMILY MEDICINE

## 2025-02-10 RX ORDER — VALACYCLOVIR HYDROCHLORIDE 1 G/1
1000 TABLET, FILM COATED ORAL 2 TIMES DAILY
Qty: 28 TABLET | Refills: 0 | Status: SHIPPED | OUTPATIENT
Start: 2025-02-10 | End: 2025-02-24

## 2025-02-10 NOTE — PROGRESS NOTES
"              Stef Hair IV, DO  [facility]  [address] [place] [address]  Phone: [phone]      Subjective     Name: Barry Mccoy   Sex: male  YOB: 1995 (29 y.o.)  MRN: [id number]  Visit Date: 2/10/25   Chief Complaint: Referral        HISTORY OF PRESENT ILLNESS:    Chief Complaint   Patient presents with    Referral       HPI        Review of Systems   All other systems reviewed and are negative.        SOCIAL HISTORY:  Tobacco - Patient  reports that he has been smoking cigars. He has never used smokeless tobacco.   Alcohol - Patient  reports current alcohol use.   Recreational Drugs - Patient  reports no history of drug use.         See tests that keep you healthy and the problem oriented documentation below.    All of your core healthy metrics are met.      Portions of this note may have been created with voice recognition software. Occasional "wrong-word" or "sound-a-like" substitutions may have occurred due to the inherent limitations of voice recognition software. Please, read the note carefully and recognize, using context, where substitutions have occurred.          Past Medical History:   Diagnosis Date    Bradycardia     Migraine headache         Review of patient's allergies indicates:  No Known Allergies     Past Surgical History:   Procedure Laterality Date    WISDOM TOOTH EXTRACTION          Family History   Problem Relation Name Age of Onset    Diabetes Other      Bradycardia Other      Prostate cancer Neg Hx      Lung cancer Neg Hx      Colon cancer Neg Hx         Current Outpatient Medications   Medication Instructions    ibuprofen (ADVIL,MOTRIN) 800 MG tablet     tamsulosin (FLOMAX) 0.4 mg, Oral, Daily    valACYclovir (VALTREX) 1,000 mg, Oral, 2 times daily        Objective     /70   Pulse (!) 58   Ht 5' 11" (1.803 m)   Wt 109.8 kg (242 lb)   BMI 33.75 kg/m²     Physical Exam  Constitutional:       General: He is not in " acute distress.     Appearance: Normal appearance. He is not ill-appearing.   HENT:      Head: Normocephalic and atraumatic.      Right Ear: External ear normal.      Left Ear: External ear normal.      Nose: Nose normal.   Eyes:      Extraocular Movements: Extraocular movements intact.   Cardiovascular:      Rate and Rhythm: Normal rate.   Pulmonary:      Effort: Pulmonary effort is normal.   Abdominal:      Palpations: Abdomen is soft.   Musculoskeletal:      Cervical back: Normal range of motion. No tenderness.   Neurological:      Mental Status: He is alert.   Psychiatric:         Mood and Affect: Mood normal.         Behavior: Behavior normal.        All recently obtained labs have been reviewed and discussed in detail with the patient.   Assessment     1. Infertility male    2. 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    Has been actively trying with spouse for the proximally last 8 months to no avail.  Recommend semen analysis in the office today.    Can consider imaging, genetic, endocrine testing upon results.         Relevant Orders    Semen Analysis    Cold sore (Chronic)     Chronic with exacerbation.  Patient has exacerbation approximately every 3 months.  Currently using Abreva.  I did talk with the patient about etiology, progression, prognosis.  Will provide Valtrex.         Relevant Medications    valACYclovir (VALTREX) 1000 MG tablet       No follow-ups on file.    Patient advised that is symptoms worsen, they should call or report directly to local emergency department.    Stef Hair, DO  [facility]

## 2025-02-10 NOTE — ASSESSMENT & PLAN NOTE
Chronic problem for patient.    Has been actively trying with spouse for the proximally last 8 months to no avail.  Recommend semen analysis in the office today.    Can consider imaging, genetic, endocrine testing upon results.

## 2025-02-10 NOTE — ASSESSMENT & PLAN NOTE
Chronic with exacerbation.  Patient has exacerbation approximately every 3 months.  Currently using Abreva.  I did talk with the patient about etiology, progression, prognosis.  Will provide Valtrex.

## 2025-06-06 ENCOUNTER — OFFICE VISIT (OUTPATIENT)
Dept: FAMILY MEDICINE | Facility: CLINIC | Age: 30
End: 2025-06-06
Payer: OTHER GOVERNMENT

## 2025-06-06 VITALS
BODY MASS INDEX: 33.88 KG/M2 | SYSTOLIC BLOOD PRESSURE: 131 MMHG | DIASTOLIC BLOOD PRESSURE: 72 MMHG | HEIGHT: 71 IN | HEART RATE: 55 BPM | WEIGHT: 242 LBS

## 2025-06-06 DIAGNOSIS — M94.0 COSTOCHONDRITIS: Primary | ICD-10-CM

## 2025-06-06 RX ORDER — DEXAMETHASONE SODIUM PHOSPHATE 4 MG/ML
4 INJECTION, SOLUTION INTRA-ARTICULAR; INTRALESIONAL; INTRAMUSCULAR; INTRAVENOUS; SOFT TISSUE
Status: COMPLETED | OUTPATIENT
Start: 2025-06-06 | End: 2025-06-06

## 2025-06-06 RX ORDER — METHYLPREDNISOLONE ACETATE 40 MG/ML
40 INJECTION, SUSPENSION INTRA-ARTICULAR; INTRALESIONAL; INTRAMUSCULAR; SOFT TISSUE
Status: COMPLETED | OUTPATIENT
Start: 2025-06-06 | End: 2025-06-06

## 2025-06-06 RX ORDER — KETOROLAC TROMETHAMINE 30 MG/ML
30 INJECTION, SOLUTION INTRAMUSCULAR; INTRAVENOUS
Status: COMPLETED | OUTPATIENT
Start: 2025-06-06 | End: 2025-06-06

## 2025-06-06 RX ORDER — PREDNISONE 20 MG/1
20 TABLET ORAL DAILY
Qty: 5 TABLET | Refills: 0 | Status: SHIPPED | OUTPATIENT
Start: 2025-06-06

## 2025-06-06 RX ORDER — NAPROXEN 500 MG/1
500 TABLET ORAL 2 TIMES DAILY
Qty: 20 TABLET | Refills: 0 | Status: SHIPPED | OUTPATIENT
Start: 2025-06-06

## 2025-06-06 RX ORDER — METHOCARBAMOL 500 MG/1
1000 TABLET, FILM COATED ORAL 4 TIMES DAILY
Qty: 80 TABLET | Refills: 0 | Status: SHIPPED | OUTPATIENT
Start: 2025-06-06 | End: 2025-06-16

## 2025-06-06 RX ADMIN — KETOROLAC TROMETHAMINE 30 MG: 30 INJECTION, SOLUTION INTRAMUSCULAR; INTRAVENOUS at 09:06

## 2025-06-06 RX ADMIN — DEXAMETHASONE SODIUM PHOSPHATE 4 MG: 4 INJECTION, SOLUTION INTRA-ARTICULAR; INTRALESIONAL; INTRAMUSCULAR; INTRAVENOUS; SOFT TISSUE at 09:06

## 2025-06-06 RX ADMIN — METHYLPREDNISOLONE ACETATE 40 MG: 40 INJECTION, SUSPENSION INTRA-ARTICULAR; INTRALESIONAL; INTRAMUSCULAR; SOFT TISSUE at 09:06
